# Patient Record
Sex: FEMALE | Race: WHITE | Employment: FULL TIME | URBAN - METROPOLITAN AREA
[De-identification: names, ages, dates, MRNs, and addresses within clinical notes are randomized per-mention and may not be internally consistent; named-entity substitution may affect disease eponyms.]

---

## 2017-11-08 ENCOUNTER — HOSPITAL ENCOUNTER (EMERGENCY)
Age: 47
Discharge: HOME | End: 2017-11-08
Attending: EMERGENCY MEDICINE
Payer: COMMERCIAL

## 2017-11-08 VITALS
SYSTOLIC BLOOD PRESSURE: 125 MMHG | RESPIRATION RATE: 18 BRPM | BODY MASS INDEX: 32.1 KG/M2 | TEMPERATURE: 97.7 F | WEIGHT: 188 LBS | DIASTOLIC BLOOD PRESSURE: 74 MMHG | OXYGEN SATURATION: 98 % | HEART RATE: 75 BPM | HEIGHT: 64 IN

## 2017-11-08 DIAGNOSIS — R20.2 PARESTHESIA: Primary | ICD-10-CM

## 2017-11-08 DIAGNOSIS — T14.8XXA MUSCLE STRAIN: ICD-10-CM

## 2017-11-08 PROCEDURE — 99283 EMERGENCY DEPT VISIT LOW MDM: CPT

## 2017-11-08 RX ORDER — IBUPROFEN 800 MG/1
800 TABLET ORAL EVERY 6 HOURS PRN
COMMUNITY
End: 2017-11-08 | Stop reason: SDUPTHER

## 2017-11-08 RX ORDER — DIAZEPAM 5 MG/1
5 TABLET ORAL EVERY 8 HOURS PRN
Qty: 10 TABLET | Refills: 0 | Status: SHIPPED | OUTPATIENT
Start: 2017-11-08 | End: 2017-11-18

## 2017-11-08 RX ORDER — IBUPROFEN 800 MG/1
800 TABLET ORAL EVERY 8 HOURS PRN
Qty: 30 TABLET | Refills: 0 | Status: SHIPPED | OUTPATIENT
Start: 2017-11-08

## 2017-11-08 ASSESSMENT — PAIN SCALES - GENERAL: PAINLEVEL_OUTOF10: 3

## 2017-11-08 NOTE — ED PROVIDER NOTES
[address]  eMERGENCY dEPARTMENT eNCOUnter   Independent Attestation     Pt Name: Ester Madsen  MRN: [id number]  [place] 1970  Date of evaluation: 11/8/17       Ester Madsen is a 41 y.o. female who presents with Numbness (left hand)        I was personally available for consultation in the Emergency Department. I have reviewed the chart and agree with the documentation as recorded by the [facility], including the assessment, treatment plan and disposition. Based on the medical record the care appears appropriate    Jimmy Stout MD, Migd[facility] Maldonado [address], [facility]  Attending Emergency Physician                   Ashkan Rico MD  11/08/17 2037
°F (36.5 °C)   TempSrc: Oral   SpO2: 98%   Weight: 188 lb (85.3 kg)   Height: 5' 3.5\" (1.613 m)       Instructed the patient to follow-up with family doctor/occupational health. May benefit from outpatient physical therapy versus MRI to the neck. We'll give her muscle relaxers and anti-inflammatories, instructed to apply warm compresses and heating pad to the area a few times a day. Patient instructed to return to the emergency room if symptoms worsen, return, or any other concern right away which is agreed. MEDS  Orders Placed This Encounter   Medications    ibuprofen (ADVIL;MOTRIN) 800 MG tablet     Sig: Take 1 tablet by mouth every 8 hours as needed for Pain     Dispense:  30 tablet     Refill:  0    diazepam (VALIUM) 5 MG tablet     Sig: Take 1 tablet by mouth every 8 hours as needed for Anxiety . Dispense:  10 tablet     Refill:  0         CONSULTS:  None    PROCEDURES:  None        FINAL IMPRESSION      1. Paresthesia    2. Muscle strain          DISPOSITION/PLAN   DISPOSITION Decision To Discharge    PATIENT REFERRED TO:  Dona Verduzco CNP  [address] [facility]  [phone]    Schedule an appointment as soon as possible for a visit       [facility]  [facility] [address]  [address] [facility]  [phone]    For worsening symptoms, or any other concern      DISCHARGE MEDICATIONS:  New Prescriptions    DIAZEPAM (VALIUM) 5 MG TABLET    Take 1 tablet by mouth every 8 hours as needed for Anxiety .     IBUPROFEN (ADVIL;MOTRIN) 800 MG TABLET    Take 1 tablet by mouth every 8 hours as needed for Pain       (Please note that portions of this note were completed with a voice recognition program.  Efforts were made to edit the dictations but occasionally words are mis-transcribed.)    Brittney Torres, [address] [place], [place]  11/08/17 4905

## 2018-02-06 ENCOUNTER — HOSPITAL ENCOUNTER (OUTPATIENT)
Age: 48
End: 2018-02-06
Payer: COMMERCIAL

## 2018-02-06 ENCOUNTER — HOSPITAL ENCOUNTER (OUTPATIENT)
Dept: GENERAL RADIOLOGY | Age: 48
Discharge: HOME | End: 2018-02-08
Payer: COMMERCIAL

## 2018-02-06 DIAGNOSIS — T14.90XA INJURY: ICD-10-CM

## 2018-02-06 PROCEDURE — 72050 X-RAY EXAM NECK SPINE 4/5VWS: CPT

## 2018-02-13 ENCOUNTER — HOSPITAL ENCOUNTER (OUTPATIENT)
Dept: MRI IMAGING | Age: 48
Discharge: HOME | End: 2018-02-15
Payer: COMMERCIAL

## 2018-02-13 DIAGNOSIS — S13.9XXA NECK SPRAIN, INITIAL ENCOUNTER: ICD-10-CM

## 2018-02-13 DIAGNOSIS — S46.912A STRAIN OF LEFT SHOULDER, INITIAL ENCOUNTER: ICD-10-CM

## 2018-02-13 PROCEDURE — 72141 MRI NECK SPINE W/O DYE: CPT
